# Patient Record
Sex: MALE | HISPANIC OR LATINO | Employment: FULL TIME | URBAN - METROPOLITAN AREA
[De-identification: names, ages, dates, MRNs, and addresses within clinical notes are randomized per-mention and may not be internally consistent; named-entity substitution may affect disease eponyms.]

---

## 2020-03-22 NOTE — NUR
ELIZABETH RN: Attempted to call pt at [phone] x [phone], no answer, no voicemail 
for labs on 3/15/20. Culture letter sent.

## 2020-06-22 ENCOUNTER — APPOINTMENT (OUTPATIENT)
Dept: RADIOLOGY | Facility: MEDICAL CENTER | Age: 20
End: 2020-06-22
Attending: EMERGENCY MEDICINE
Payer: COMMERCIAL

## 2020-06-22 ENCOUNTER — NON-PROVIDER VISIT (OUTPATIENT)
Dept: OCCUPATIONAL MEDICINE | Facility: CLINIC | Age: 20
End: 2020-06-22
Payer: COMMERCIAL

## 2020-06-22 ENCOUNTER — HOSPITAL ENCOUNTER (EMERGENCY)
Facility: MEDICAL CENTER | Age: 20
End: 2020-06-22
Attending: EMERGENCY MEDICINE
Payer: COMMERCIAL

## 2020-06-22 VITALS
BODY MASS INDEX: 25.91 KG/M2 | OXYGEN SATURATION: 99 % | HEART RATE: 73 BPM | DIASTOLIC BLOOD PRESSURE: 53 MMHG | HEIGHT: 70 IN | RESPIRATION RATE: 17 BRPM | WEIGHT: 181 LBS | TEMPERATURE: 98.1 F | SYSTOLIC BLOOD PRESSURE: 104 MMHG

## 2020-06-22 DIAGNOSIS — S05.11XA EYE CONTUSION, RIGHT, INITIAL ENCOUNTER: ICD-10-CM

## 2020-06-22 DIAGNOSIS — S01.111A RIGHT EYELID LACERATION, INITIAL ENCOUNTER: ICD-10-CM

## 2020-06-22 DIAGNOSIS — S05.31XA LACERATION OF RIGHT CONJUNCTIVA, INITIAL ENCOUNTER: ICD-10-CM

## 2020-06-22 DIAGNOSIS — Z02.83 ENCOUNTER FOR DRUG SCREENING: ICD-10-CM

## 2020-06-22 LAB
AMP AMPHETAMINE: NORMAL
COC COCAINE: NORMAL
INT CON NEG: NORMAL
INT CON POS: NORMAL
MET METHAMPHETAMINES: NORMAL
OPI OPIATES: NORMAL
PCP PHENCYCLIDINE: NORMAL
POC DRUG COMMENT 753798-OCCUPATIONAL HEALTH: NORMAL
THC: NORMAL

## 2020-06-22 PROCEDURE — 700105 HCHG RX REV CODE 258: Performed by: EMERGENCY MEDICINE

## 2020-06-22 PROCEDURE — 700101 HCHG RX REV CODE 250: Performed by: EMERGENCY MEDICINE

## 2020-06-22 PROCEDURE — 70480 CT ORBIT/EAR/FOSSA W/O DYE: CPT

## 2020-06-22 PROCEDURE — 99026 IN-HOSPITAL ON CALL SERVICE: CPT | Performed by: PREVENTIVE MEDICINE

## 2020-06-22 PROCEDURE — 36415 COLL VENOUS BLD VENIPUNCTURE: CPT

## 2020-06-22 PROCEDURE — 96365 THER/PROPH/DIAG IV INF INIT: CPT

## 2020-06-22 PROCEDURE — 99284 EMERGENCY DEPT VISIT MOD MDM: CPT

## 2020-06-22 PROCEDURE — 80305 DRUG TEST PRSMV DIR OPT OBS: CPT | Performed by: PREVENTIVE MEDICINE

## 2020-06-22 PROCEDURE — 700111 HCHG RX REV CODE 636 W/ 250 OVERRIDE (IP): Performed by: EMERGENCY MEDICINE

## 2020-06-22 RX ORDER — PROPARACAINE HYDROCHLORIDE 5 MG/ML
1 SOLUTION/ DROPS OPHTHALMIC ONCE
Status: COMPLETED | OUTPATIENT
Start: 2020-06-22 | End: 2020-06-22

## 2020-06-22 RX ADMIN — PROPARACAINE HYDROCHLORIDE 1 DROP: 5 SOLUTION/ DROPS OPHTHALMIC at 11:15

## 2020-06-22 RX ADMIN — CEFAZOLIN 2 G: 10 INJECTION, POWDER, FOR SOLUTION INTRAVENOUS; PARENTERAL at 11:23

## 2020-06-22 RX ADMIN — FLUORESCEIN SODIUM 1 MG: 1 STRIP OPHTHALMIC at 11:15

## 2020-06-22 NOTE — DISCHARGE INSTRUCTIONS
Proceed directly to Dr. Gonsales's office for repair of your eyelid laceration and further ophthalmologic evaluation for your eye injury.  He is expecting you!

## 2020-06-22 NOTE — ED NOTES
Med Rec completed per pt at bedside  Ok per Pt to discuss medications with visitor/s present    Allergies reviewed  No ABX in last 14 days    Pt denies taking any RX's or OTC's

## 2020-06-22 NOTE — LETTER
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 (if applicable) Picking up stakes     How did this injury or occupational disease occur? Be specific and answer in detail. Use additional sheet if necessary)  while picking up stakes, wand off of stakes poke me in the right eye.    If you believe that you have an occupational disease, when did you first have knowledge of the disability and it relationship to your employment? n/a Witnesses to the Accident  n/a    Nature of Injury or Occupational Disease  Workers' Compensation Part(s) of Body Injured or Affected  Eye (R), N/A, N/A    I CERTIFY THAT THE ABOVE IS TRUE AND CORRECT TO THE BEST OF MY KNOWLEDGE AND THAT I HAVE PROVIDED THIS INFORMATION IN ORDER TO OBTAIN THE BENEFITS OF [place] " IN[place]IVE OR [place]).  I HEREBY AUTHORIZE ANY PHYSICIAN, CHIROPRACTOR, SURGEON, PRACTITIONER, OR OTHER PERSON, ANY HOSPITAL, INCLUDING [facility] OR [facility], ANY MEDICAL SERVICE ORGANIZATION, ANY INSURANCE COMPANY, OR OTHER INSTITUTION OR ORGANIZATION TO RELEASE TO EACH OTHER, ANY MEDICAL OR OTHER INFORMATION, INCLUDING BENEFITS PAID OR PAYABLE, PERTINENT TO THIS INJURY OR DISEASE, EXCEPT INFORMATION RELATIVE TO DIAGNOSIS, TREATMENT AND/OR COUNSELING FOR AIDS, PSYCHOLOGICAL CONDITIONS, ALCOHOL OR CONTROLLED SUBSTANCES, FOR WHICH I MUST GIVE SPECIFIC AUTHORIZATION.  A PHOTOSTAT OF THIS AUTHORIZATION SHALL BE AS VALID AS THE ORIGINAL.  Date   06/22/2020                    Place   [facility] Emergency Dept                         Employee’s Signature   THIS REPORT MUST BE COMPLETED AND MAILED WITHIN 3 WORKING DAYS OF TREATMENT   Place [facility], EMERGENCY DEPT                                                                             Name of Facility [facility]   Date  6/22/2020 Diagnosis  (S01.111A) Right eyelid laceration, initial encounter, Acute  (S05.31XA) Laceration of right conjunctiva, initial encounter, Acute  (S05.11XA) Eye contusion, right, initial encounter, Acute Is there evidence the injured employee was under the influence of alcohol and/or another controlled substance at the time of accident?   Hour  2:12 PM Description of Injury or Disease  Right eyelid laceration, initial encounter  Laceration of right conjunctiva, initial encounter  Eye contusion, right, initial encounter No  Comments:No   Treatment  Evaluation of the eye with slit lamp.  CT scan of the orbits.  Visual acuity testing.  Ophthalmologic consultation.  Have you advised the patient to remain off work five days or more?         No   X-Ray Findings  Negative  Comments:CT orbits negative for globe rupture If Yes   From Date    To Date      From  "information given by the employee, together with medical evidence, can you directly connect this injury or occupational disease as job incurred?   Comments:yes If No, is employee capable of: Full Duty  No Modified Duty  Yes   Is additional medical care by a physician indicated? Yes  Comments:Ophthalmology in office now If Modified Duty, Specify any Limitations / Restrictions   Avoid contact with dirt and debris until cleared by ophthalmology   Do you know of any previous injury or disease contributing to this condition or occupational disease? No  Comments:no    Date 6/22/2020 Print Doctor’s Name Vidya Ulloa certify the employer’s copy of this form was mailed on:   Address [address] [id number] [phone] INSURER’S USE ONLY   Provider’s Tax ID Number [id number] Telephone Dept: [phone]    Doctor’s Signature e-SignVIDYA ULLOA M.D. Degree  MD      Form C-4 (rev.10/07)                                                           BRIEF DESCRIPTION OF RIGHTS AND BENEFITS  (Pursuant to [place] 616[place].050)  Notice of Injury or Occupational Disease (Incident Report Form C-1): If an injury or occupational disease (OD) arises out of and in the course of employment, you must provide written notice to your employer as soon as practicable, but no later than 7 days after the accident or OD. Your employer shall maintain a sufficient supply of the required forms.  Claim for Compensation (Form C-4): If medical treatment is sought, the form C-4 is available at the place of initial treatment. A completed \"Claim for Compensation\" (Form C-4) must be filed within 90 days after an accident or OD. The treating physician or chiropractor must, within 3 working days after treatment, complete and mail to the employer, the employer's insurer and third-party , the Claim for Compensation.    Medical Treatment: If you require medical treatment for your on-the-job injury or OD, you may be required to select a " physician or chiropractor from a list provided by your workers’ compensation insurer, if it has contracted with an Organization for Managed Care (MCO) or Preferred Provider Organization (PPO) or providers of health care. If your employer has not entered into a contract with an MCO or PPO, you may select a physician or chiropractor from the Panel of Physicians and Chiropractors. Any medical costs related to your industrial injury or OD will be paid by your insurer.  Temporary Total Disability (TTD): If your doctor has certified that you are unable to work for a period of at least 5 consecutive days, or 5 cumulative days in a 20-day period, or places restrictions on you that your employer does not accommodate, you may be entitled to TTD compensation.  Temporary Partial Disability (TPD): If the wage you receive upon reemployment is less than the compensation for TTD to which you are entitled, the insurer may be required to pay you TPD compensation to make up the difference. TPD can only be paid for a maximum of 24 months.    Permanent Partial Disability (PPD): When your medical condition is stable and there is an indication of a PPD as a result of your injury or OD, within 30 days, your insurer must arrange for an evaluation by a rating physician or chiropractor to determine the degree of your PPD. The amount of your PPD award depends on the date of injury, the results of the PPD evaluation and your age and wage.    Permanent Total Disability (PTD): If you are medically certified by a treating physician or chiropractor as permanently and totally disabled and have been granted a PTD status by your insurer, you are entitled to receive monthly benefits not to exceed 66 2/3% of your average monthly wage. The amount of your PTD payments is subject to reduction if you previously received a PPD award.    Vocational Rehabilitation Services: You may be eligible for vocational rehabilitation services if you are unable to return  to the job due to a permanent physical impairment or permanent restrictions as a result of your injury or occupational disease.    Transportation and Per Jane Reimbursement: You may be eligible for travel expenses and per jane associated with medical treatment.    Reopening: You may be able to reopen your claim if your condition worsens after claim closure.     Appeal Process: If you disagree with a written determination issued by the insurer or the insurer does not respond to your request, you may appeal to the Department of Administration, , by following the instructions contained in your determination letter. You must appeal the determination within 70 days from the date of the determination letter at [address], [address], [place], [place] [address], or [address], [address], [place] [address]. If you disagree with the  decision, you may appeal to the Department of Administration, . You must file your appeal within 30 days from the date of the  decision letter at [address], [address], [place], [place] [address], or [address] [facility] [address], [place] [address]. If you disagree with a decision of an , you may file a petition for judicial review with the District Court. You must do so within 30 days of the Appeal Officer’s decision. You may be represented by an  at your own expense or you may contact the [facility] for possible representation.    [place]  for Injured Workers (NAIW): If you disagree with a  decision, you may request that NAIW represent you without charge at an  Hearing. For information regarding denial of benefits, you may contact the [facility] at: [address], [address], [place] [address], [phone], or [address], [address], [place] [address], [phone]    To File a Complaint with the Division: If you  wish to file a complaint with the  of the Division of Industrial Relations (DIR),  please contact the Workers’ Compensation Section, [address], [address], [place], [place] [address], telephone [phone], or [address], [address], [place], [place] [address], telephone [phone].    For assistance with Workers’ Compensation Issues: You may contact the Office of the Governor Consumer Health Assistance, [address] [place] [address], [address], [place], [place] [address], Toll Free 1-274.727.1105, Web site: [email or web address]., E-mail [email or web address].  D-2 (rev. 06/18)              __________________________________________________________________                                    _______06/22/2020__________            Employee Name / Signature                                                                                                                            Date

## 2020-06-22 NOTE — ED TRIAGE NOTES
"Chief Complaint   Patient presents with   • T-5000 Facial Trauma   • Eye Injury     Pt reports that he was poked in the right eye with a pinky with metal tor in the eye. Per pt mckee reports eye bleeding prior to placing patch.  Pt reports that it initially stuck in his eye. Eye not visualized in triage.  /73   Pulse 84   Temp 36.7 °C (98.1 °F)   Resp 18   Ht 1.778 m (5' 10\")   Wt 82.1 kg (181 lb)   SpO2 98%   Pt informed of wait times. Educated on triage process.  Asked to return to triage RN for any new or worsening of symptoms. Thanked for patience.        "

## 2020-06-22 NOTE — ED PROVIDER NOTES
"ED Provider Note  CHIEF COMPLAINT  Chief Complaint   Patient presents with   • T-5000 Facial Trauma   • Eye Injury       CHITO Guzman is a 19 y.o. male who presents to the ER with complaint of a right eye injury after he was poked in the eye with a metal tor which is the diameter of a small pinky finger.  He states he was carrying a bundle of metal rods when 1 of the rods was sticking out and poked him in the eye.  He presented to [facility] with bleeding from his eye and was sent immediately here to the emergency department for evaluation.  The patient states that initially he thought he could see okay, but that his vision was blurry.  He has quite a bit of eye pain.  He describes some mild photophobia.  He is up-to-date on his tetanus shot.  No allergies to antibiotics.  No other injury.    REVIEW OF SYSTEMS  Positive for right eye pain, bleeding, blurry vision.  Negative for headache, nausea, vomiting    PAST MEDICAL HISTORY   has a past medical history of ASTHMA.    SOCIAL HISTORY  Social History     Tobacco Use   • Smoking status: Never Smoker   Substance and Sexual Activity   • Alcohol use: No   • Drug use: No   • Sexual activity: Not on file       SURGICAL HISTORY  patient denies any surgical history    CURRENT MEDICATIONS  Reviewed.  See Encounter Summary.      ALLERGIES  No Known Allergies    PHYSICAL EXAM  VITAL SIGNS: /73   Pulse 84   Temp 36.7 °C (98.1 °F)   Resp 18   Ht 1.778 m (5' 10\")   Wt 82.1 kg (181 lb)   SpO2 98%   BMI 25.97 kg/m²   Constitutional: Anxious, mild to moderate distress.  Patient has a bandage over his right eye.  HENT: Normocephalic, atraumatic; Bilateral external ears normal. Nose normal.   Eyes: Right eye:.  There is a small laceration to the margin of the right lateral eyelid.  There is also a small laceration to the lateral canthus.  There is a superficial 1 cm laceration just under the right lower eyelid.  Slit-lamp exam reveals what appears to " be a conjunctival laceration.   Pupil is round and reactive.  The pupil appears to be equal on gross examination, but under slit lamp, there may be a very subtle bit of irregularity although this is unclear.  There is no hyphema.  There is some mild photophobia.  Extraocular movements appear to be intact.  No dye uptake with fluorescein staining.  Visual acuities reveal that the right eye is 20/100.  There is no active bleeding.  There is clotted blood up around the upper and lower eyelids, mostly on the lateral aspect.  Thorax & Lungs: Non-labored respirations  Skin: Warm and Dry, No erythema, No rash.   Extremities:   Full range of motion  Neurologic: Alert and oriented x 4, clear speech   Psychiatric: Affect and Mood normal      COURSE & MEDICAL DECISION MAKING  Nursing notes and vital signs were reviewed. (See chart for details)    [id number] W/O PLUS RECONS   Final Result      1.  No orbit fracture.   2.  No intraorbital foreign body or hematoma demonstrated.   3.  Ocular globes appear symmetric.          The patient presents to the Emergency Department with right eye injury after a metal pole which measures the diameter of a small pinky finger stabbed the patient in the right eye.  He sustained a laceration to his right upper lid margin on the lateral aspect of the upper lid.  He also sustained a small lateral canthus laceration.  Additionally there is a partial thickness, nonsuturable laceration just below the lower right eyelid.  The conjunctive appears to be somewhat lacerated as well.  There is no hyphema.  There is no dye uptake with fluorescein staining.  Patient complains of blurry vision.  Visual acuity is 20/100 in the right eye.  This is significantly different from his left eye or from his bilateral visual acuities.  CT scan of the orbits are negative for any acute globe injury.  I spoke with the ophthalmologist on-call.  He asked me to text him pictures of the lacerations.  After reviewing the  pictures, he will see the patient in the office right now.  Patient is to go immediately to the ophthalmologist office for further evaluation and management of his eyelid lacerations and blunt trauma to the eye.  Patient's tetanus is up-to-date.  He was given 2 g of Ancef here in the emergency department.  He was also given some pain medicine for pain control.      1320: Discussed the case with Dr. Gonsales, ophthalmologist on-call.  He asked that I text some photos of the patient's lid lacerations so that he can time the repair.    1340:  Dr. Gonsales has reviewed the photos that I texted to him.  He would like to see the patient in his office right now.  He will repair the laceration in his office.  He will evaluate the posterior eye and his office since he has special equipment at his office to perform that evaluation.        I verified that the patient was wearing a mask and I was wearing appropriate PPE every time I entered the room. The patient's mask was on the patient at all times during my encounter.    FINAL IMPRESSION  1. Right eyelid laceration, initial encounter Acute    2. Laceration of right conjunctiva, initial encounter Acute    3. Eye contusion, right, initial encounter Acute      This dictation has been created using voice recognition software. The accuracy of the dictation is limited by the abilities of the software. I expect there may be some errors of grammar and possibly content. I made every attempt to manually correct the errors within my dictation. However, errors related to voice recognition software may still exist and should be interpreted within the appropriate context.

## 2020-10-23 ENCOUNTER — HOSPITAL ENCOUNTER (OUTPATIENT)
Dept: HOSPITAL 8 - LAB | Age: 20
Discharge: HOME | End: 2020-10-23
Attending: FAMILY MEDICINE
Payer: COMMERCIAL

## 2020-10-23 DIAGNOSIS — R74.8: Primary | ICD-10-CM

## 2020-10-23 DIAGNOSIS — R25.3: ICD-10-CM

## 2020-10-23 LAB
ALBUMIN SERPL-MCNC: 3.8 G/DL (ref 3.4–5)
ALP SERPL-CCNC: 145 U/L (ref 45–117)
ALT SERPL-CCNC: 25 U/L (ref 12–78)
ANION GAP SERPL CALC-SCNC: 5 MMOL/L (ref 5–15)
BILIRUB SERPL-MCNC: 0.5 MG/DL (ref 0.2–1)
CALCIUM SERPL-MCNC: 9.2 MG/DL (ref 8.5–10.1)
CHLORIDE SERPL-SCNC: 108 MMOL/L (ref 98–107)
CREAT SERPL-MCNC: 0.93 MG/DL (ref 0.7–1.3)
ERYTHROCYTE [DISTWIDTH] IN BLOOD BY AUTOMATED COUNT: 13.6 % (ref 9.4–14.8)
MCH RBC QN AUTO: 32.1 PG (ref 27.5–34.5)
MCHC RBC AUTO-ENTMCNC: 33.4 G/DL (ref 33.2–36.2)
PLATELET # BLD AUTO: 340 X10^3/UL (ref 130–400)
PMV BLD AUTO: 7.8 FL (ref 7.4–10.4)
PROT SERPL-MCNC: 7.3 G/DL (ref 6.4–8.2)
RBC # BLD AUTO: 5.12 X10^6/UL (ref 4.38–5.82)

## 2020-10-23 PROCEDURE — 36415 COLL VENOUS BLD VENIPUNCTURE: CPT

## 2020-10-23 PROCEDURE — 80053 COMPREHEN METABOLIC PANEL: CPT

## 2020-10-23 PROCEDURE — 85027 COMPLETE CBC AUTOMATED: CPT

## 2020-10-23 PROCEDURE — 83735 ASSAY OF MAGNESIUM: CPT

## 2021-08-15 ENCOUNTER — HOSPITAL ENCOUNTER (EMERGENCY)
Dept: HOSPITAL 8 - ED | Age: 21
Discharge: HOME | End: 2021-08-15
Payer: COMMERCIAL

## 2021-08-15 VITALS — BODY MASS INDEX: 26.23 KG/M2 | HEIGHT: 70 IN | WEIGHT: 183.2 LBS

## 2021-08-15 VITALS — SYSTOLIC BLOOD PRESSURE: 118 MMHG | DIASTOLIC BLOOD PRESSURE: 63 MMHG

## 2021-08-15 DIAGNOSIS — J06.9: ICD-10-CM

## 2021-08-15 DIAGNOSIS — U07.1: Primary | ICD-10-CM

## 2021-08-15 PROCEDURE — 99284 EMERGENCY DEPT VISIT MOD MDM: CPT

## 2021-08-15 PROCEDURE — U0003 INFECTIOUS AGENT DETECTION BY NUCLEIC ACID (DNA OR RNA); SEVERE ACUTE RESPIRATORY SYNDROME CORONAVIRUS 2 (SARS-COV-2) (CORONAVIRUS DISEASE [COVID-19]), AMPLIFIED PROBE TECHNIQUE, MAKING USE OF HIGH THROUGHPUT TECHNOLOGIES AS DESCRIBED BY CMS-2020-01-R: HCPCS

## 2021-08-15 PROCEDURE — 71045 X-RAY EXAM CHEST 1 VIEW: CPT
